# Patient Record
Sex: FEMALE | Race: WHITE | NOT HISPANIC OR LATINO | URBAN - METROPOLITAN AREA
[De-identification: names, ages, dates, MRNs, and addresses within clinical notes are randomized per-mention and may not be internally consistent; named-entity substitution may affect disease eponyms.]

---

## 2020-08-13 ENCOUNTER — INPATIENT (INPATIENT)
Facility: HOSPITAL | Age: 85
LOS: 2 days | Discharge: HOME | End: 2020-08-16
Attending: HOSPITALIST | Admitting: HOSPITALIST
Payer: MEDICARE

## 2020-08-13 VITALS
HEART RATE: 122 BPM | SYSTOLIC BLOOD PRESSURE: 132 MMHG | WEIGHT: 169.98 LBS | OXYGEN SATURATION: 99 % | HEIGHT: 64 IN | DIASTOLIC BLOOD PRESSURE: 77 MMHG | RESPIRATION RATE: 16 BRPM | TEMPERATURE: 99 F

## 2020-08-13 LAB
ALBUMIN SERPL ELPH-MCNC: 3.3 G/DL — SIGNIFICANT CHANGE UP (ref 3.3–5)
ALP SERPL-CCNC: 65 U/L — SIGNIFICANT CHANGE UP (ref 40–120)
ALT FLD-CCNC: 31 U/L — SIGNIFICANT CHANGE UP (ref 12–78)
ANION GAP SERPL CALC-SCNC: 6 MMOL/L — SIGNIFICANT CHANGE UP (ref 5–17)
AST SERPL-CCNC: 35 U/L — SIGNIFICANT CHANGE UP (ref 15–37)
BASOPHILS # BLD AUTO: 0.02 K/UL — SIGNIFICANT CHANGE UP (ref 0–0.2)
BASOPHILS NFR BLD AUTO: 0.5 % — SIGNIFICANT CHANGE UP (ref 0–2)
BILIRUB SERPL-MCNC: 0.4 MG/DL — SIGNIFICANT CHANGE UP (ref 0.2–1.2)
BUN SERPL-MCNC: 23 MG/DL — SIGNIFICANT CHANGE UP (ref 7–23)
CALCIUM SERPL-MCNC: 8.8 MG/DL — SIGNIFICANT CHANGE UP (ref 8.5–10.1)
CHLORIDE SERPL-SCNC: 104 MMOL/L — SIGNIFICANT CHANGE UP (ref 96–108)
CO2 SERPL-SCNC: 23 MMOL/L — SIGNIFICANT CHANGE UP (ref 22–31)
CREAT SERPL-MCNC: 0.58 MG/DL — SIGNIFICANT CHANGE UP (ref 0.5–1.3)
EOSINOPHIL # BLD AUTO: 0.06 K/UL — SIGNIFICANT CHANGE UP (ref 0–0.5)
EOSINOPHIL NFR BLD AUTO: 1.4 % — SIGNIFICANT CHANGE UP (ref 0–6)
GLUCOSE SERPL-MCNC: 129 MG/DL — HIGH (ref 70–99)
HCT VFR BLD CALC: 36.3 % — SIGNIFICANT CHANGE UP (ref 34.5–45)
HGB BLD-MCNC: 11.9 G/DL — SIGNIFICANT CHANGE UP (ref 11.5–15.5)
IMM GRANULOCYTES NFR BLD AUTO: 0.2 % — SIGNIFICANT CHANGE UP (ref 0–1.5)
LYMPHOCYTES # BLD AUTO: 0.71 K/UL — LOW (ref 1–3.3)
LYMPHOCYTES # BLD AUTO: 16.2 % — SIGNIFICANT CHANGE UP (ref 13–44)
MCHC RBC-ENTMCNC: 28.7 PG — SIGNIFICANT CHANGE UP (ref 27–34)
MCHC RBC-ENTMCNC: 32.8 GM/DL — SIGNIFICANT CHANGE UP (ref 32–36)
MCV RBC AUTO: 87.5 FL — SIGNIFICANT CHANGE UP (ref 80–100)
MONOCYTES # BLD AUTO: 0.52 K/UL — SIGNIFICANT CHANGE UP (ref 0–0.9)
MONOCYTES NFR BLD AUTO: 11.9 % — SIGNIFICANT CHANGE UP (ref 2–14)
NEUTROPHILS # BLD AUTO: 3.05 K/UL — SIGNIFICANT CHANGE UP (ref 1.8–7.4)
NEUTROPHILS NFR BLD AUTO: 69.8 % — SIGNIFICANT CHANGE UP (ref 43–77)
NRBC # BLD: 0 /100 WBCS — SIGNIFICANT CHANGE UP (ref 0–0)
PLATELET # BLD AUTO: 160 K/UL — SIGNIFICANT CHANGE UP (ref 150–400)
POTASSIUM SERPL-MCNC: 4 MMOL/L — SIGNIFICANT CHANGE UP (ref 3.5–5.3)
POTASSIUM SERPL-SCNC: 4 MMOL/L — SIGNIFICANT CHANGE UP (ref 3.5–5.3)
PROT SERPL-MCNC: 7.9 GM/DL — SIGNIFICANT CHANGE UP (ref 6–8.3)
RBC # BLD: 4.15 M/UL — SIGNIFICANT CHANGE UP (ref 3.8–5.2)
RBC # FLD: 14.2 % — SIGNIFICANT CHANGE UP (ref 10.3–14.5)
SARS-COV-2 RNA SPEC QL NAA+PROBE: SIGNIFICANT CHANGE UP
SODIUM SERPL-SCNC: 133 MMOL/L — LOW (ref 135–145)
WBC # BLD: 4.37 K/UL — SIGNIFICANT CHANGE UP (ref 3.8–10.5)
WBC # FLD AUTO: 4.37 K/UL — SIGNIFICANT CHANGE UP (ref 3.8–10.5)

## 2020-08-13 PROCEDURE — 72131 CT LUMBAR SPINE W/O DYE: CPT | Mod: 26

## 2020-08-13 PROCEDURE — 99285 EMERGENCY DEPT VISIT HI MDM: CPT

## 2020-08-13 PROCEDURE — 99222 1ST HOSP IP/OBS MODERATE 55: CPT | Mod: AI

## 2020-08-13 RX ORDER — SENNA PLUS 8.6 MG/1
2 TABLET ORAL AT BEDTIME
Refills: 0 | Status: DISCONTINUED | OUTPATIENT
Start: 2020-08-13 | End: 2020-08-16

## 2020-08-13 RX ORDER — KETOROLAC TROMETHAMINE 30 MG/ML
15 SYRINGE (ML) INJECTION ONCE
Refills: 0 | Status: DISCONTINUED | OUTPATIENT
Start: 2020-08-13 | End: 2020-08-13

## 2020-08-13 RX ORDER — GLUCAGON INJECTION, SOLUTION 0.5 MG/.1ML
1 INJECTION, SOLUTION SUBCUTANEOUS ONCE
Refills: 0 | Status: DISCONTINUED | OUTPATIENT
Start: 2020-08-13 | End: 2020-08-16

## 2020-08-13 RX ORDER — HEPARIN SODIUM 5000 [USP'U]/ML
5000 INJECTION INTRAVENOUS; SUBCUTANEOUS EVERY 8 HOURS
Refills: 0 | Status: DISCONTINUED | OUTPATIENT
Start: 2020-08-13 | End: 2020-08-16

## 2020-08-13 RX ORDER — MORPHINE SULFATE 50 MG/1
2 CAPSULE, EXTENDED RELEASE ORAL ONCE
Refills: 0 | Status: DISCONTINUED | OUTPATIENT
Start: 2020-08-13 | End: 2020-08-13

## 2020-08-13 RX ORDER — DEXTROSE 50 % IN WATER 50 %
25 SYRINGE (ML) INTRAVENOUS ONCE
Refills: 0 | Status: DISCONTINUED | OUTPATIENT
Start: 2020-08-13 | End: 2020-08-16

## 2020-08-13 RX ORDER — DEXTROSE 50 % IN WATER 50 %
15 SYRINGE (ML) INTRAVENOUS ONCE
Refills: 0 | Status: DISCONTINUED | OUTPATIENT
Start: 2020-08-13 | End: 2020-08-16

## 2020-08-13 RX ORDER — OXYCODONE HYDROCHLORIDE 5 MG/1
5 TABLET ORAL EVERY 6 HOURS
Refills: 0 | Status: DISCONTINUED | OUTPATIENT
Start: 2020-08-13 | End: 2020-08-16

## 2020-08-13 RX ORDER — SODIUM CHLORIDE 9 MG/ML
1000 INJECTION, SOLUTION INTRAVENOUS
Refills: 0 | Status: DISCONTINUED | OUTPATIENT
Start: 2020-08-13 | End: 2020-08-16

## 2020-08-13 RX ORDER — OXYCODONE AND ACETAMINOPHEN 5; 325 MG/1; MG/1
1 TABLET ORAL ONCE
Refills: 0 | Status: DISCONTINUED | OUTPATIENT
Start: 2020-08-13 | End: 2020-08-13

## 2020-08-13 RX ORDER — DEXTROSE 50 % IN WATER 50 %
12.5 SYRINGE (ML) INTRAVENOUS ONCE
Refills: 0 | Status: DISCONTINUED | OUTPATIENT
Start: 2020-08-13 | End: 2020-08-16

## 2020-08-13 RX ORDER — INSULIN LISPRO 100/ML
VIAL (ML) SUBCUTANEOUS
Refills: 0 | Status: DISCONTINUED | OUTPATIENT
Start: 2020-08-13 | End: 2020-08-16

## 2020-08-13 RX ORDER — INSULIN LISPRO 100/ML
VIAL (ML) SUBCUTANEOUS AT BEDTIME
Refills: 0 | Status: DISCONTINUED | OUTPATIENT
Start: 2020-08-13 | End: 2020-08-16

## 2020-08-13 RX ORDER — MORPHINE SULFATE 50 MG/1
4 CAPSULE, EXTENDED RELEASE ORAL ONCE
Refills: 0 | Status: DISCONTINUED | OUTPATIENT
Start: 2020-08-13 | End: 2020-08-13

## 2020-08-13 RX ORDER — SIMVASTATIN 20 MG/1
40 TABLET, FILM COATED ORAL AT BEDTIME
Refills: 0 | Status: DISCONTINUED | OUTPATIENT
Start: 2020-08-13 | End: 2020-08-16

## 2020-08-13 RX ORDER — ACETAMINOPHEN 500 MG
650 TABLET ORAL EVERY 6 HOURS
Refills: 0 | Status: DISCONTINUED | OUTPATIENT
Start: 2020-08-13 | End: 2020-08-16

## 2020-08-13 RX ADMIN — OXYCODONE AND ACETAMINOPHEN 1 TABLET(S): 5; 325 TABLET ORAL at 23:38

## 2020-08-13 RX ADMIN — Medication 15 MILLIGRAM(S): at 19:47

## 2020-08-13 RX ADMIN — MORPHINE SULFATE 4 MILLIGRAM(S): 50 CAPSULE, EXTENDED RELEASE ORAL at 21:26

## 2020-08-13 RX ADMIN — MORPHINE SULFATE 2 MILLIGRAM(S): 50 CAPSULE, EXTENDED RELEASE ORAL at 19:32

## 2020-08-13 RX ADMIN — OXYCODONE AND ACETAMINOPHEN 1 TABLET(S): 5; 325 TABLET ORAL at 22:50

## 2020-08-13 RX ADMIN — MORPHINE SULFATE 4 MILLIGRAM(S): 50 CAPSULE, EXTENDED RELEASE ORAL at 21:41

## 2020-08-13 RX ADMIN — Medication 15 MILLIGRAM(S): at 19:32

## 2020-08-13 RX ADMIN — MORPHINE SULFATE 2 MILLIGRAM(S): 50 CAPSULE, EXTENDED RELEASE ORAL at 19:47

## 2020-08-13 NOTE — ED ADULT NURSE NOTE - NSIMPLEMENTINTERV_GEN_ALL_ED
Implemented All Fall with Harm Risk Interventions:  [place] to call system. Call bell, personal items and telephone within reach. Instruct patient to call for assistance. Room bathroom lighting operational. Non-slip footwear when patient is off stretcher. Physically safe environment: no spills, clutter or unnecessary equipment. Stretcher in lowest position, wheels locked, appropriate side rails in place. Provide visual cue, wrist band, yellow gown, etc. Monitor gait and stability. Monitor for mental status changes and reorient to person, place, and time. Review medications for side effects contributing to fall risk. Reinforce activity limits and safety measures with patient and family. Provide visual clues: red socks.

## 2020-08-13 NOTE — ED ADULT NURSE NOTE - ED STAT RN HANDOFF DETAILS 2
Report given to receiving RNbrooklyn pts history, current condition and reason for admission discussed, safety concerns addressed and reviewed, pt currently in stable condition, IV flushes for patency and site shows no signs or symptoms of infiltrate, dressing is clean dry and intact, pt is aware of plan of care. Pt education deemed successful at time of report after patient demonstrates successful teach back for proficiency.

## 2020-08-13 NOTE — CONSULT NOTE ADULT - ASSESSMENT
86 y/o  female with PMH of HDL, DM-2 came to the ED complaining of back pain x 2 days. Pain is located in lower back, sharp in nature, non-radiating, 10/10 in severity, took 2 Tylenol before coming to the ED with no relief. In the ED, CT lumbar: multilevel degenerative spondylosis with interval progression since last imaging (2015); Pain medications given, labs done and reviewed.    Lumbar radiculopathy   Admit to medical floor   CT lumbar as stated above   Pain control   PT evaluation in AM     HLD   Simvastatin 40mg (as per outpatient med)     DM-2  Insulin sliding scale     Supportive   DVT prophylaxis: heparin sc   Diet: DASH       Care plan discussed with patient

## 2020-08-13 NOTE — ED PROVIDER NOTE - OBJECTIVE STATEMENT
Triage Nurses Notes: "Back pain x 2 days, denies trauma"    Pertinent PMH/PSH/FHx/SHx and Review of Systems contained within:    84 y/o F with a PMHx of DM and Hypercholesteremia presents to the ED c/o lower back pain (LT side worst than RT side) radiating to her LT legs x3 days. Patient notes that the pain started after a prolong walk.  Patient states that the pain is severe and Tylenol at home is not helping, which prompted the ER visit. Denies falls, injuries, numbness, tingling, changes in bowel or bladder movements, fever, chills or nausea. Patient denies EtOH/tobacco/illicit substance use.    2 call attempts were made to Patient's family listed on the chart. There was no answer and a message was left. Triage Nurses Notes: "Back pain x 2 days, denies trauma"    Pertinent PMH/PSH/FHx/SHx and Review of Systems contained within:    84 y/o F with a PMHx of DM and Hypercholesteremia presents to the ED c/o lower back pain (LT side worst than RT side) radiating to her LT legs x3 days. Patient notes that the pain started after a prolonged walk.  Patient states that the pain is severe and Tylenol at home is not helping, which prompted the ER visit. Denies falls, injuries, numbness, tingling, changes in bowel or bladder movements, fever, chills or nausea. Patient denies EtOH/tobacco/illicit substance use.    spoke with daughter who is present in ER, she confirms the above. Triage Nurses Notes: "Back pain x 2 days, denies trauma"    Pertinent PMH/PSH/FHx/SHx and Review of Systems contained within:    86 y/o F with a PMHx of DM and Hypercholesteremia presents to the ED c/o lower back pain (LT side worst than RT side) radiating to her left leg x3 days. Patient notes that the pain started after a prolonged walk.  Patient states that the pain is severe and Tylenol at home is not helping, which prompted the ER visit. Denies falls, injuries, numbness, tingling, changes in bowel or bladder movements, fever, chills or nausea. Patient denies EtOH/tobacco/illicit substance use.    spoke with daughter who is present in ER, she confirms the above.

## 2020-08-13 NOTE — H&P ADULT - NSHPPHYSICALEXAM_GEN_ALL_CORE
Vital Signs Last 24 Hrs  T(C): 38.1 (14 Aug 2020 00:35), Max: 38.1 (14 Aug 2020 00:35)  T(F): 100.6 (14 Aug 2020 00:35), Max: 100.6 (14 Aug 2020 00:35)  HR: 89 (14 Aug 2020 00:35) (81 - 122)  BP: 142/76 (14 Aug 2020 00:35) (128/67 - 142/76)  BP(mean): 100 (14 Aug 2020 00:35) (100 - 100)  RR: 22 (14 Aug 2020 00:35) (14 - 23)  SpO2: 96% (14 Aug 2020 00:35) (95% - 99%)    PHYSICAL EXAM:    GENERAL: NAD, well-groomed, well-developed  HEAD:  Atraumatic, Normocephalic  EYES: EOMI, PERRLA, conjunctiva and sclera clear  ENMT: No tonsillar erythema, exudates, or enlargement; Moist mucous membranes, No lesions  NECK: Supple, No JVD, Normal thyroid  NERVOUS SYSTEM:  Alert & Oriented X3, Good concentration; Motor Strength 5/5 B/L upper and lower extremities; DTRs 2+ intact and symmetric  CHEST/LUNG: Clear to percussion bilaterally; No rales, rhonchi, wheezing, or rubs  HEART: Regular rate and rhythm; No rubs, or gallops, +S1,S2  ABDOMEN: Soft, Nontender, Nondistended; Bowel sounds present, Back +point tenderness lower back  EXTREMITIES:  2+ Peripheral Pulses, No clubbing, cyanosis, or edema  LYMPH: No cervical adenopathy  RECTAL: deferred  BREAST: deferred  : deferred  SKIN: No rashes or lesions    IMPROVE VTE Individual Risk Assessment        RISK                                                          Points  [  ] Previous VTE                                                3  [  ] Thrombophilia                                             2  [  ] Lower limb paralysis                                    2        (unable to hold up >15 seconds)    [  ] Current Cancer                                             2         (within 6 months)  [  x] Immobilization > 24 hrs                              1  [  ] ICU/CCU stay > 24 hours                            1  [  x] Age > 60                                                    1  IMPROVE VTE Score _____2____

## 2020-08-13 NOTE — CONSULT NOTE ADULT - SUBJECTIVE AND OBJECTIVE BOX
REASON FOR CONSULT: telehospitalist evaluation    HPI:   84 y/o  female with PMH of HDL, DM-2 came to the ED complaining of back pain x 2 days. Pain is located in lower back, sharp in nature, non-radiating, 10/10 in severity, took 2 Tylenol before coming to the ED with no relief. She has no fall, trauma to the affected site, bowel/bladder incontinence, HA, paresthesia, fever, chills, chest pain, shortness of breath.     PMH/surg: DM-2, HLD     Social Hx: no cigarette, alcohol or illicit drug use. Lives with family     Family Hx: non-contributory     Meds: patient could not recall the names of her medications     Allergies:  NKDA       OBJECTIVE    Vital Signs Last 24 Hrs  T(C): 36.7 (13 Aug 2020 22:53), Max: 37.2 (13 Aug 2020 18:38)  T(F): 98.1 (13 Aug 2020 22:53), Max: 99 (13 Aug 2020 18:38)  HR: 83 (13 Aug 2020 23:11) (81 - 122)  BP: 129/62 (13 Aug 2020 23:11) (128/67 - 141/67)  BP(mean): --  RR: 20 (13 Aug 2020 23:11) (14 - 23)  SpO2: 97% (13 Aug 2020 23:11) (95% - 99%)        PHYSICAL EXAM: Tele-evaluation precludes physical exam.       LABS AND IMAGING DATA:                        11.9   4.37  )-----------( 160      ( 13 Aug 2020 19:46 )             36.3     08-13    133<L>  |  104  |  23  ----------------------------<  129<H>  4.0   |  23  |  0.58    Ca    8.8      13 Aug 2020 19:46    TPro  7.9  /  Alb  3.3  /  TBili  0.4  /  DBili  x   /  AST  35  /  ALT  31  /  AlkPhos  65  08-13

## 2020-08-13 NOTE — ED ADULT NURSE NOTE - OBJECTIVE STATEMENT
Pt is received at bed 2 A&Ox3. Pt co of back pain 10/10 that started 2 days ago. As per EMS, pt unable to ambulate due to back pain, usually ambulatory. Pt is tachy  123 and borderline febrile 99 degrees F. Glucose 137. pt has hx of diabetes. Pt is mostly [place] speaking but is able to understand minimal english.

## 2020-08-13 NOTE — H&P ADULT - HISTORY OF PRESENT ILLNESS
Pt is a 84 y/o  female with PMH of HDL, arthritis and DM-2 came to the ED complaining of back pain x 2 days. Pain is located in lower back, sharp in nature, non-radiating, 10/10 in severity, took 2 Tylenol before coming to the ED with no relief. She has no fall, trauma to the affected site, bowel/bladder incontinence, HA, paresthesia, fever, chills, chest pain, shortness of breath, n/v/d/c no travels or sick contacts no acute fidig on ct

## 2020-08-13 NOTE — ED PROVIDER NOTE - NS ED ROS FT
No fever/chills, No photophobia/eye pain/changes in vision, No ear pain/sore throat/dysphagia, No chest pain/palpitations, no SOB/cough/wheeze/stridor, No abdominal pain, No N/V/D, no dysuria/frequency/discharge, No neck, + Lower back pain, no rash, no changes in neurological status/function.

## 2020-08-13 NOTE — ED PROVIDER NOTE - PHYSICAL EXAMINATION
Gen: Alert, Mild distress with movement  Head: NC, AT, PERRL, EOMI, normal lids/conjunctiva  ENT: normal hearing, patent oropharynx without erythema/exudate, uvula midline  Neck: +supple, no tenderness/meningismus/JVD, +Trachea midline  Pulm: Bilateral BS, normal resp effort, no wheeze/stridor/retractions  CV: RRR, no M/R/G, +dist pulses  Abd: soft, NT/ND, Negative [place] signs, +BS, no palpable masses  Mskel: no edema/erythema/cyanosis. TTP of lower lumbar sacral area to the left.   Skin: no rash, warm/dry  Neuro: AAOx3, no apparent sensory/motor deficits, coordination intact Gen: Alert, Mild distress with movement  Head: NC, AT, EOMI, normal lids/conjunctiva  ENT: normal hearing, patent oropharynx without erythema/exudate, uvula midline  Neck: +supple, no tenderness/meningismus/JVD, +Trachea midline  Pulm: Bilateral BS, normal resp effort, no wheeze/stridor/retractions  CV: RRR, no M/R/G, +dist pulses  Abd: soft, NT/ND, Negative [place] signs, +BS, no palpable masses  Mskel: no edema/erythema/cyanosis. TTP of lower lumbar sacral area to the left.   Skin: no rash, warm/dry  Neuro: AAOx3, no apparent sensory/motor deficits, coordination intact, BL motor strength, good tone

## 2020-08-13 NOTE — ED PROVIDER NOTE - CLINICAL SUMMARY MEDICAL DECISION MAKING FREE TEXT BOX
Patient presents with acute onset back pain after prolonged walking.  VSS.  No signs of cord compression. Lab values reviewed, there are no values which require acute intervention.  CT negative for acute fx.  Patient given pain meds in ER, continues to be symptomatic, unable to ambulate secondary to pain.  Will admit for pain control.  Patient is to be admitted to the hospital and the case was discussed with the admitting physician.  Any changes in plan, additional imaging/labs, and further work up will be at the discretion of the admitting physician.

## 2020-08-13 NOTE — H&P ADULT - NSHPLABSRESULTS_GEN_ALL_CORE
LABS:                        11.9   4.37  )-----------( 160      ( 13 Aug 2020 19:46 )             36.3     08-13    133<L>  |  104  |  23  ----------------------------<  129<H>  4.0   |  23  |  0.58    Ca    8.8      13 Aug 2020 19:46    TPro  7.9  /  Alb  3.3  /  TBili  0.4  /  DBili  x   /  AST  35  /  ALT  31  /  AlkPhos  65  08-13        CAPILLARY BLOOD GLUCOSE      POCT Blood Glucose.: 136 mg/dL (14 Aug 2020 00:45)        RADIOLOGY & ADDITIONAL TESTS:    Imaging Personally Reviewed:  [ X] YES  [ ] NO

## 2020-08-13 NOTE — H&P ADULT - PROBLEM SELECTOR PROBLEM 4
Preventive measure Type 2 diabetes mellitus with hyperglycemia, with long-term current use of insulin

## 2020-08-14 DIAGNOSIS — Z29.9 ENCOUNTER FOR PROPHYLACTIC MEASURES, UNSPECIFIED: ICD-10-CM

## 2020-08-14 DIAGNOSIS — M19.90 UNSPECIFIED OSTEOARTHRITIS, UNSPECIFIED SITE: ICD-10-CM

## 2020-08-14 DIAGNOSIS — M54.9 DORSALGIA, UNSPECIFIED: ICD-10-CM

## 2020-08-14 DIAGNOSIS — E11.65 TYPE 2 DIABETES MELLITUS WITH HYPERGLYCEMIA: ICD-10-CM

## 2020-08-14 DIAGNOSIS — E78.00 PURE HYPERCHOLESTEROLEMIA, UNSPECIFIED: ICD-10-CM

## 2020-08-14 LAB
GLUCOSE BLDC GLUCOMTR-MCNC: 136 MG/DL — HIGH (ref 70–99)
GLUCOSE BLDC GLUCOMTR-MCNC: 151 MG/DL — HIGH (ref 70–99)
GLUCOSE BLDC GLUCOMTR-MCNC: 156 MG/DL — HIGH (ref 70–99)
GLUCOSE BLDC GLUCOMTR-MCNC: 170 MG/DL — HIGH (ref 70–99)
GLUCOSE BLDC GLUCOMTR-MCNC: 212 MG/DL — HIGH (ref 70–99)
SARS-COV-2 IGG SERPL QL IA: NEGATIVE — SIGNIFICANT CHANGE UP
SARS-COV-2 IGM SERPL IA-ACNC: <3.8 AU/ML — SIGNIFICANT CHANGE UP

## 2020-08-14 PROCEDURE — 72148 MRI LUMBAR SPINE W/O DYE: CPT | Mod: 26

## 2020-08-14 PROCEDURE — 99233 SBSQ HOSP IP/OBS HIGH 50: CPT

## 2020-08-14 PROCEDURE — 73560 X-RAY EXAM OF KNEE 1 OR 2: CPT | Mod: 26,LT

## 2020-08-14 RX ORDER — DEXAMETHASONE 0.5 MG/5ML
4 ELIXIR ORAL EVERY 8 HOURS
Refills: 0 | Status: COMPLETED | OUTPATIENT
Start: 2020-08-14 | End: 2020-08-16

## 2020-08-14 RX ORDER — KETOROLAC TROMETHAMINE 30 MG/ML
30 SYRINGE (ML) INJECTION ONCE
Refills: 0 | Status: DISCONTINUED | OUTPATIENT
Start: 2020-08-14 | End: 2020-08-14

## 2020-08-14 RX ADMIN — HEPARIN SODIUM 5000 UNIT(S): 5000 INJECTION INTRAVENOUS; SUBCUTANEOUS at 21:15

## 2020-08-14 RX ADMIN — OXYCODONE HYDROCHLORIDE 5 MILLIGRAM(S): 5 TABLET ORAL at 12:26

## 2020-08-14 RX ADMIN — Medication 1: at 11:36

## 2020-08-14 RX ADMIN — HEPARIN SODIUM 5000 UNIT(S): 5000 INJECTION INTRAVENOUS; SUBCUTANEOUS at 14:13

## 2020-08-14 RX ADMIN — Medication 1: at 08:08

## 2020-08-14 RX ADMIN — Medication 650 MILLIGRAM(S): at 00:40

## 2020-08-14 RX ADMIN — Medication 30 MILLIGRAM(S): at 16:53

## 2020-08-14 RX ADMIN — HEPARIN SODIUM 5000 UNIT(S): 5000 INJECTION INTRAVENOUS; SUBCUTANEOUS at 05:24

## 2020-08-14 RX ADMIN — Medication 1: at 16:52

## 2020-08-14 RX ADMIN — Medication 4 MILLIGRAM(S): at 21:15

## 2020-08-14 RX ADMIN — Medication 4 MILLIGRAM(S): at 14:14

## 2020-08-14 RX ADMIN — SENNA PLUS 2 TABLET(S): 8.6 TABLET ORAL at 21:15

## 2020-08-14 RX ADMIN — SIMVASTATIN 40 MILLIGRAM(S): 20 TABLET, FILM COATED ORAL at 21:15

## 2020-08-14 NOTE — PHYSICAL THERAPY INITIAL EVALUATION ADULT - PERTINENT HX OF CURRENT PROBLEM, REHAB EVAL
Pt is a 84 y/o  female with PMH of HDL, arthritis and DM-2 came to the ED complaining of back pain x 2 days. Pain is located in lower back, sharp in nature, non-radiating, 10/10 in severity, took 2 Tylenol before coming to the ED with no relief. She has no fall, trauma to the affected site, bowel/bladder incontinence, HA, paresthesia, fever, chills, chest pain, shortness of breath, n/v/d/c no travels or sick contacts. L knee XR (-), CT lumbar spine with no acute findings (+) degenerative changes

## 2020-08-14 NOTE — PHYSICAL THERAPY INITIAL EVALUATION ADULT - ADDITIONAL COMMENTS
Daughter was able to provide information regarding previous level of function and home environment:   Pt lives in a private house with 3 steps to enter with B handrails that are wide apart, once inside there are no further steps to negotiate. Bathroom is a tub shower with regular toilet, no grab bars in bathroom. there is a rolling walker at home but it was pt's spouse, no other dme in the home. Pt is R hand dominant, wears glasses, does not use dme, (-) falls, (-) buckling, (+) community distance ambulator, does not drive.

## 2020-08-14 NOTE — PHYSICAL THERAPY INITIAL EVALUATION ADULT - ACTIVE RANGE OF MOTION EXAMINATION, REHAB EVAL
deficits as listed below/L hip PROM: flexion to 30 degrees, abduction to 5 degrees, ER to neutral, IR to neutral/Right LE Active ROM was WFL (within functional limits)/bilateral upper extremity Active ROM was WFL (within functional limits)

## 2020-08-15 LAB
GLUCOSE BLDC GLUCOMTR-MCNC: 161 MG/DL — HIGH (ref 70–99)
GLUCOSE BLDC GLUCOMTR-MCNC: 174 MG/DL — HIGH (ref 70–99)
GLUCOSE BLDC GLUCOMTR-MCNC: 249 MG/DL — HIGH (ref 70–99)
GLUCOSE BLDC GLUCOMTR-MCNC: 254 MG/DL — HIGH (ref 70–99)

## 2020-08-15 PROCEDURE — 99233 SBSQ HOSP IP/OBS HIGH 50: CPT

## 2020-08-15 RX ADMIN — Medication 650 MILLIGRAM(S): at 21:06

## 2020-08-15 RX ADMIN — HEPARIN SODIUM 5000 UNIT(S): 5000 INJECTION INTRAVENOUS; SUBCUTANEOUS at 05:15

## 2020-08-15 RX ADMIN — Medication 1: at 08:25

## 2020-08-15 RX ADMIN — HEPARIN SODIUM 5000 UNIT(S): 5000 INJECTION INTRAVENOUS; SUBCUTANEOUS at 13:43

## 2020-08-15 RX ADMIN — Medication 1: at 16:04

## 2020-08-15 RX ADMIN — Medication 4 MILLIGRAM(S): at 21:10

## 2020-08-15 RX ADMIN — SIMVASTATIN 40 MILLIGRAM(S): 20 TABLET, FILM COATED ORAL at 21:09

## 2020-08-15 RX ADMIN — Medication 2: at 11:50

## 2020-08-15 RX ADMIN — Medication 4 MILLIGRAM(S): at 05:15

## 2020-08-15 RX ADMIN — SENNA PLUS 2 TABLET(S): 8.6 TABLET ORAL at 21:10

## 2020-08-15 RX ADMIN — OXYCODONE HYDROCHLORIDE 5 MILLIGRAM(S): 5 TABLET ORAL at 06:17

## 2020-08-15 RX ADMIN — OXYCODONE HYDROCHLORIDE 5 MILLIGRAM(S): 5 TABLET ORAL at 05:17

## 2020-08-15 RX ADMIN — Medication 650 MILLIGRAM(S): at 22:06

## 2020-08-15 RX ADMIN — HEPARIN SODIUM 5000 UNIT(S): 5000 INJECTION INTRAVENOUS; SUBCUTANEOUS at 21:10

## 2020-08-15 RX ADMIN — Medication 1: at 21:49

## 2020-08-15 RX ADMIN — Medication 4 MILLIGRAM(S): at 13:43

## 2020-08-15 NOTE — PROGRESS NOTE ADULT - PROBLEM SELECTOR PROBLEM 4
Type 2 diabetes mellitus with hyperglycemia, with long-term current use of insulin
Type 2 diabetes mellitus with hyperglycemia, with long-term current use of insulin

## 2020-08-15 NOTE — PROGRESS NOTE ADULT - SUBJECTIVE AND OBJECTIVE BOX
Patient is a 85y old  Female who presents with a chief complaint of LBP (13 Aug 2020 23:44)      INTERVAL HPI/OVERNIGHT EVENTS: c/o of knee and LBP. denies radiculopathy numbness muscle weakness or incontinence     MEDICATIONS  (STANDING):  dexAMETHasone  Injectable 5 milliGRAM(s) IV Push every 8 hours  dextrose 5%. 1000 milliLiter(s) (50 mL/Hr) IV Continuous <Continuous>  dextrose 50% Injectable 12.5 Gram(s) IV Push once  dextrose 50% Injectable 25 Gram(s) IV Push once  dextrose 50% Injectable 25 Gram(s) IV Push once  heparin   Injectable 5000 Unit(s) SubCutaneous every 8 hours  insulin lispro (HumaLOG) corrective regimen sliding scale   SubCutaneous three times a day before meals  insulin lispro (HumaLOG) corrective regimen sliding scale   SubCutaneous at bedtime  senna 2 Tablet(s) Oral at bedtime  simvastatin 40 milliGRAM(s) Oral at bedtime    MEDICATIONS  (PRN):  acetaminophen   Tablet .. 650 milliGRAM(s) Oral every 6 hours PRN Temp greater or equal to 38C (100.4F), Mild Pain (1 - 3)  dextrose 40% Gel 15 Gram(s) Oral once PRN Blood Glucose LESS THAN 70 milliGRAM(s)/deciliter  glucagon  Injectable 1 milliGRAM(s) IntraMuscular once PRN Glucose LESS THAN 70 milligrams/deciliter  oxyCODONE    IR 5 milliGRAM(s) Oral every 6 hours PRN Severe Pain (7 - 10)      Allergies    No Known Allergies    Intolerances        REVIEW OF SYSTEMS:  CONSTITUTIONAL: No fever, weight loss, or fatigue  EYES: No eye pain, visual disturbances, or discharge  ENMT:  No difficulty hearing, tinnitus, vertigo; No sinus or throat pain  NECK: No pain or stiffness  RESPIRATORY: No cough, wheezing, chills or hemoptysis; No shortness of breath  CARDIOVASCULAR: No chest pain, palpitations, dizziness, or leg swelling  GASTROINTESTINAL: No abdominal or epigastric pain. No nausea, vomiting, or hematemesis; No diarrhea or constipation. No melena or hematochezia.  GENITOURINARY: No dysuria, frequency, hematuria, or incontinence  NEUROLOGICAL: No headaches, memory loss, loss of strength, numbness, or tremors  SKIN: No itching, burning, rashes, or lesions   LYMPH NODES: No enlarged glands    Vital Signs Last 24 Hrs  T(C): 36.3 (14 Aug 2020 11:02), Max: 38.1 (14 Aug 2020 00:35)  T(F): 97.3 (14 Aug 2020 11:02), Max: 100.6 (14 Aug 2020 00:35)  HR: 78 (14 Aug 2020 11:02) (76 - 122)  BP: 115/61 (14 Aug 2020 11:02) (104/58 - 154/74)  BP(mean): 100 (14 Aug 2020 00:35) (100 - 100)  RR: 16 (14 Aug 2020 11:02) (14 - 23)  SpO2: 95% (14 Aug 2020 11:02) (94% - 99%)    PHYSICAL EXAM:  GENERAL: NAD, well-groomed, well-developed  HEAD:  Atraumatic, Normocephalic  EYES: EOMI, PERRLA, conjunctiva and sclera clear  ENMT: No tonsillar erythema, exudates, or enlargement; Moist mucous membranes, Good dentition, No lesions  NECK: Supple, No JVD, Normal thyroid  NERVOUS SYSTEM:  Alert & Oriented X3, Good concentration; Motor Strength 5/5 B/L upper and lower extremities; DTRs 2+ intact and symmetric  CHEST/LUNG: Clear to percussion bilaterally; No rales, rhonchi, wheezing, or rubs  HEART: Regular rate and rhythm; No murmurs, rubs, or gallops  ABDOMEN: Soft, Nontender, Nondistended; Bowel sounds present  EXTREMITIES:  2+ Peripheral Pulses, No clubbing, cyanosis, or edema  LYMPH: No lymphadenopathy noted  SKIN: No rashes or lesions    LABS:                        11.9   4.37  )-----------( 160      ( 13 Aug 2020 19:46 )             36.3     08-13    133<L>  |  104  |  23  ----------------------------<  129<H>  4.0   |  23  |  0.58    Ca    8.8      13 Aug 2020 19:46    TPro  7.9  /  Alb  3.3  /  TBili  0.4  /  DBili  x   /  AST  35  /  ALT  31  /  AlkPhos  65  08-13        CAPILLARY BLOOD GLUCOSE      POCT Blood Glucose.: 156 mg/dL (14 Aug 2020 10:55)  POCT Blood Glucose.: 151 mg/dL (14 Aug 2020 07:48)  POCT Blood Glucose.: 136 mg/dL (14 Aug 2020 00:45)      RADIOLOGY & ADDITIONAL TESTS:    Imaging Personally Reviewed:  [ ] YES  [ ] NO    Consultant(s) Notes Reviewed:  [ ] YES  [ ] NO    Care Discussed with Consultants/Other Providers [ ] YES  [ ] NO
Patient is a 85y old  Female who presents with a chief complaint of LBP (13 Aug 2020 23:44)      INTERVAL HPI/OVERNIGHT EVENTS: back pain much improved. denies radiculopathy numbness muscle weakness or incontinence     MEDICATIONS  (STANDING):  dexAMETHasone  Injectable 5 milliGRAM(s) IV Push every 8 hours  dextrose 5%. 1000 milliLiter(s) (50 mL/Hr) IV Continuous <Continuous>  dextrose 50% Injectable 12.5 Gram(s) IV Push once  dextrose 50% Injectable 25 Gram(s) IV Push once  dextrose 50% Injectable 25 Gram(s) IV Push once  heparin   Injectable 5000 Unit(s) SubCutaneous every 8 hours  insulin lispro (HumaLOG) corrective regimen sliding scale   SubCutaneous three times a day before meals  insulin lispro (HumaLOG) corrective regimen sliding scale   SubCutaneous at bedtime  senna 2 Tablet(s) Oral at bedtime  simvastatin 40 milliGRAM(s) Oral at bedtime    MEDICATIONS  (PRN):  acetaminophen   Tablet .. 650 milliGRAM(s) Oral every 6 hours PRN Temp greater or equal to 38C (100.4F), Mild Pain (1 - 3)  dextrose 40% Gel 15 Gram(s) Oral once PRN Blood Glucose LESS THAN 70 milliGRAM(s)/deciliter  glucagon  Injectable 1 milliGRAM(s) IntraMuscular once PRN Glucose LESS THAN 70 milligrams/deciliter  oxyCODONE    IR 5 milliGRAM(s) Oral every 6 hours PRN Severe Pain (7 - 10)      Allergies    No Known Allergies    Intolerances        REVIEW OF SYSTEMS:  CONSTITUTIONAL: No fever, weight loss, or fatigue  EYES: No eye pain, visual disturbances, or discharge  ENMT:  No difficulty hearing, tinnitus, vertigo; No sinus or throat pain  NECK: No pain or stiffness  RESPIRATORY: No cough, wheezing, chills or hemoptysis; No shortness of breath  CARDIOVASCULAR: No chest pain, palpitations, dizziness, or leg swelling  GASTROINTESTINAL: No abdominal or epigastric pain. No nausea, vomiting, or hematemesis; No diarrhea or constipation. No melena or hematochezia.  GENITOURINARY: No dysuria, frequency, hematuria, or incontinence  NEUROLOGICAL: No headaches, memory loss, loss of strength, numbness, or tremors  SKIN: No itching, burning, rashes, or lesions   LYMPH NODES: No enlarged glands    Vital Signs Last 24 Hrs  T(C): 36.6 (15 Aug 2020 06:22), Max: 37.3 (14 Aug 2020 16:54)  T(F): 97.9 (15 Aug 2020 06:22), Max: 99.1 (14 Aug 2020 16:54)  HR: 71 (15 Aug 2020 06:22) (58 - 90)  BP: 123/60 (15 Aug 2020 06:22) (123/60 - 143/64)  BP(mean): --  RR: 18 (14 Aug 2020 23:45) (18 - 18)  SpO2: 96% (14 Aug 2020 23:45) (94% - 96%)    PHYSICAL EXAM:  GENERAL: NAD, well-groomed, well-developed  HEAD:  Atraumatic, Normocephalic  EYES: EOMI, PERRLA, conjunctiva and sclera clear  ENMT: No tonsillar erythema, exudates, or enlargement; Moist mucous membranes, Good dentition, No lesions  NECK: Supple, No JVD, Normal thyroid  NERVOUS SYSTEM:  Alert & Oriented X3, Good concentration; Motor Strength 5/5 B/L upper and lower extremities; DTRs 2+ intact and symmetric  CHEST/LUNG: Clear to percussion bilaterally; No rales, rhonchi, wheezing, or rubs  HEART: Regular rate and rhythm; No murmurs, rubs, or gallops  ABDOMEN: Soft, Nontender, Nondistended; Bowel sounds present  BACK: normal leg raise   EXTREMITIES:  2+ Peripheral Pulses, No clubbing, cyanosis, or edema  LYMPH: No lymphadenopathy noted  SKIN: No rashes or lesions    LABS:                        11.9   4.37  )-----------( 160      ( 13 Aug 2020 19:46 )             36.3     08-13    133<L>  |  104  |  23  ----------------------------<  129<H>  4.0   |  23  |  0.58    Ca    8.8      13 Aug 2020 19:46    TPro  7.9  /  Alb  3.3  /  TBili  0.4  /  DBili  x   /  AST  35  /  ALT  31  /  AlkPhos  65  08-13        CAPILLARY BLOOD GLUCOSE        RADIOLOGY & ADDITIONAL TESTS:    Imaging Personally Reviewed:  [x ] YES  [ ] NO    Consultant(s) Notes Reviewed:  [x ] YES  [ ] NO    Care Discussed with Consultants/Other Providers [ ] YES  [ ] NO

## 2020-08-15 NOTE — PROGRESS NOTE ADULT - PROBLEM SELECTOR PLAN 1
ct w/o acute findings radicular sxs. has long hx of back pain, will give a trial of steroids and NSAIDs   pain management   pt consult  check knee xrays
pain much imrpoved today mri and ct w/o acute findingshas long hx of back pain, c/w  steroids and NSAIDs   pain management   pt consult pending but likely to be dc back home as shes getting up around the room    knee xrays shows OA and pain imrpoved

## 2020-08-16 ENCOUNTER — TRANSCRIPTION ENCOUNTER (OUTPATIENT)
Age: 85
End: 2020-08-16

## 2020-08-16 VITALS
DIASTOLIC BLOOD PRESSURE: 78 MMHG | RESPIRATION RATE: 18 BRPM | HEART RATE: 78 BPM | OXYGEN SATURATION: 94 % | TEMPERATURE: 97 F | SYSTOLIC BLOOD PRESSURE: 150 MMHG

## 2020-08-16 LAB
GLUCOSE BLDC GLUCOMTR-MCNC: 162 MG/DL — HIGH (ref 70–99)
GLUCOSE BLDC GLUCOMTR-MCNC: 200 MG/DL — HIGH (ref 70–99)

## 2020-08-16 PROCEDURE — 99239 HOSP IP/OBS DSCHRG MGMT >30: CPT

## 2020-08-16 RX ORDER — DEXAMETHASONE 0.5 MG/5ML
1 ELIXIR ORAL
Qty: 4 | Refills: 0
Start: 2020-08-16 | End: 2020-08-17

## 2020-08-16 RX ORDER — SENNA PLUS 8.6 MG/1
2 TABLET ORAL
Qty: 0 | Refills: 0 | DISCHARGE
Start: 2020-08-16

## 2020-08-16 RX ORDER — OXYCODONE HYDROCHLORIDE 5 MG/1
1 TABLET ORAL
Qty: 6 | Refills: 0
Start: 2020-08-16 | End: 2020-08-17

## 2020-08-16 RX ADMIN — Medication 1: at 07:50

## 2020-08-16 RX ADMIN — Medication 4 MILLIGRAM(S): at 05:20

## 2020-08-16 RX ADMIN — Medication 1: at 11:37

## 2020-08-16 RX ADMIN — HEPARIN SODIUM 5000 UNIT(S): 5000 INJECTION INTRAVENOUS; SUBCUTANEOUS at 05:21

## 2020-08-16 NOTE — DISCHARGE NOTE NURSING/CASE MANAGEMENT/SOCIAL WORK - NSDCPNINST_GEN_ALL_CORE
Pts daughter at bedside.  Understanding of discharge instructions, medications and scheduling follow up appointments verbalized by pt and daughter.  Written instructions given.

## 2020-08-16 NOTE — DISCHARGE NOTE PROVIDER - HOSPITAL COURSE
pt w/l chronic Lbp presnents with acute pain. denies trauma          Problem/Plan - 1:    ·  Problem: Intractable back pain.  Plan: pain much improved today after steroids and analgesia. mri and ct w/o acute findings. has long hx of back pain,      pain management     pt consult recommending home physical therapy.     stable for discharge with follow up with orthopedist          Problem/Plan - 2:    ·  Problem: Hypercholesteremia.  Plan: statin.          Problem/Plan - 3:    ·  Problem: Arthritis.  Plan: as above.          Problem/Plan - 4:    ·  Problem: Type 2 diabetes mellitus with hyperglycemia, with long-term current use of insulin.  Plan:.

## 2020-08-16 NOTE — DISCHARGE NOTE NURSING/CASE MANAGEMENT/SOCIAL WORK - PATIENT PORTAL LINK FT
You can access the FollowMyHealth Patient Portal offered by [facility] by registering at the following website: [email or web address]. By joining [employer]’s FollowMyHealth portal, you will also be able to view your health information using other applications (apps) compatible with our system.

## 2020-08-16 NOTE — DISCHARGE NOTE PROVIDER - NSDCCPCAREPLAN_GEN_ALL_CORE_FT
PRINCIPAL DISCHARGE DIAGNOSIS  Diagnosis: Intractable back pain  Assessment and Plan of Treatment: pt w/l chronic Lbp presnents with acute pain. denies trauma    Problem/Plan - 1:  ·  Problem: Intractable back pain.  Plan: pain much improved today after steroids and analgesia. mri and ct w/o acute findings. has long hx of back pain,    pain management   pt consult recommending home physical therapy.   stable for discharge with follow up with orthopedist    Problem/Plan - 2:  ·  Problem: Hypercholesteremia.  Plan: statin.    Problem/Plan - 3:  ·  Problem: Arthritis.  Plan: as above.    Problem/Plan - 4:  ·  Problem: Type 2 diabetes mellitus with hyperglycemia, with long-term current use of insulin.  Plan:.

## 2020-08-16 NOTE — DISCHARGE NOTE PROVIDER - CARE PROVIDER_API CALL
Ary Edwards S  ORTHOPAEDIC SURGERY  [address] [facility] [address]  [place] [address]  Phone: [phone]  Fax: [phone]  Follow Up Time:

## 2020-08-16 NOTE — DISCHARGE NOTE PROVIDER - NSDCMRMEDTOKEN_GEN_ALL_CORE_FT
celecoxib 200 mg oral capsule: 1 cap(s) orally once a day  methocarbamol 500 mg oral tablet: 1 tab(s) orally 3 times a day  senna oral tablet: 2 tab(s) orally once a day (at bedtime)  simvastatin 40 mg oral tablet: 1 tab(s) orally once a day (at bedtime)  traMADol 50 mg oral tablet: 1 tab(s) orally 4 times a day, As Needed -Mild Pain

## 2020-08-19 ENCOUNTER — INPATIENT (INPATIENT)
Facility: HOSPITAL | Age: 85
LOS: 1 days | Discharge: NURSING, REHAB OR LONG-TERM CARE | End: 2020-08-21
Attending: INTERNAL MEDICINE | Admitting: INTERNAL MEDICINE
Payer: MEDICARE

## 2020-08-19 VITALS
DIASTOLIC BLOOD PRESSURE: 74 MMHG | HEIGHT: 60 IN | WEIGHT: 169.98 LBS | OXYGEN SATURATION: 99 % | TEMPERATURE: 98 F | SYSTOLIC BLOOD PRESSURE: 141 MMHG | HEART RATE: 71 BPM | RESPIRATION RATE: 16 BRPM

## 2020-08-19 LAB
ALBUMIN SERPL ELPH-MCNC: 3.5 G/DL — SIGNIFICANT CHANGE UP (ref 3.3–5)
ALP SERPL-CCNC: 68 U/L — SIGNIFICANT CHANGE UP (ref 40–120)
ALT FLD-CCNC: 67 U/L — SIGNIFICANT CHANGE UP (ref 12–78)
ANION GAP SERPL CALC-SCNC: 10 MMOL/L — SIGNIFICANT CHANGE UP (ref 5–17)
APPEARANCE UR: CLEAR — SIGNIFICANT CHANGE UP
APTT BLD: 23.9 SEC — LOW (ref 27.5–35.5)
AST SERPL-CCNC: 36 U/L — SIGNIFICANT CHANGE UP (ref 15–37)
BASOPHILS # BLD AUTO: 0.02 K/UL — SIGNIFICANT CHANGE UP (ref 0–0.2)
BASOPHILS NFR BLD AUTO: 0.2 % — SIGNIFICANT CHANGE UP (ref 0–2)
BILIRUB SERPL-MCNC: 0.5 MG/DL — SIGNIFICANT CHANGE UP (ref 0.2–1.2)
BILIRUB UR-MCNC: NEGATIVE — SIGNIFICANT CHANGE UP
BUN SERPL-MCNC: 34 MG/DL — HIGH (ref 7–23)
CALCIUM SERPL-MCNC: 9.5 MG/DL — SIGNIFICANT CHANGE UP (ref 8.5–10.1)
CHLORIDE SERPL-SCNC: 103 MMOL/L — SIGNIFICANT CHANGE UP (ref 96–108)
CO2 SERPL-SCNC: 27 MMOL/L — SIGNIFICANT CHANGE UP (ref 22–31)
COLOR SPEC: YELLOW — SIGNIFICANT CHANGE UP
CREAT SERPL-MCNC: 0.92 MG/DL — SIGNIFICANT CHANGE UP (ref 0.5–1.3)
DIFF PNL FLD: NEGATIVE — SIGNIFICANT CHANGE UP
EOSINOPHIL # BLD AUTO: 0.04 K/UL — SIGNIFICANT CHANGE UP (ref 0–0.5)
EOSINOPHIL NFR BLD AUTO: 0.4 % — SIGNIFICANT CHANGE UP (ref 0–6)
GLUCOSE BLDC GLUCOMTR-MCNC: 106 MG/DL — HIGH (ref 70–99)
GLUCOSE BLDC GLUCOMTR-MCNC: 114 MG/DL — HIGH (ref 70–99)
GLUCOSE SERPL-MCNC: 98 MG/DL — SIGNIFICANT CHANGE UP (ref 70–99)
GLUCOSE UR QL: NEGATIVE MG/DL — SIGNIFICANT CHANGE UP
HCT VFR BLD CALC: 38.1 % — SIGNIFICANT CHANGE UP (ref 34.5–45)
HGB BLD-MCNC: 12.5 G/DL — SIGNIFICANT CHANGE UP (ref 11.5–15.5)
IMM GRANULOCYTES NFR BLD AUTO: 1.3 % — SIGNIFICANT CHANGE UP (ref 0–1.5)
INR BLD: 1.06 RATIO — SIGNIFICANT CHANGE UP (ref 0.88–1.16)
KETONES UR-MCNC: NEGATIVE — SIGNIFICANT CHANGE UP
LEUKOCYTE ESTERASE UR-ACNC: NEGATIVE — SIGNIFICANT CHANGE UP
LYMPHOCYTES # BLD AUTO: 28.4 % — SIGNIFICANT CHANGE UP (ref 13–44)
LYMPHOCYTES # BLD AUTO: 3.11 K/UL — SIGNIFICANT CHANGE UP (ref 1–3.3)
MCHC RBC-ENTMCNC: 28 PG — SIGNIFICANT CHANGE UP (ref 27–34)
MCHC RBC-ENTMCNC: 32.8 GM/DL — SIGNIFICANT CHANGE UP (ref 32–36)
MCV RBC AUTO: 85.4 FL — SIGNIFICANT CHANGE UP (ref 80–100)
MONOCYTES # BLD AUTO: 1.39 K/UL — HIGH (ref 0–0.9)
MONOCYTES NFR BLD AUTO: 12.7 % — SIGNIFICANT CHANGE UP (ref 2–14)
NEUTROPHILS # BLD AUTO: 6.26 K/UL — SIGNIFICANT CHANGE UP (ref 1.8–7.4)
NEUTROPHILS NFR BLD AUTO: 57 % — SIGNIFICANT CHANGE UP (ref 43–77)
NITRITE UR-MCNC: NEGATIVE — SIGNIFICANT CHANGE UP
NRBC # BLD: 0 /100 WBCS — SIGNIFICANT CHANGE UP (ref 0–0)
PH UR: 8 — SIGNIFICANT CHANGE UP (ref 5–8)
PLATELET # BLD AUTO: 306 K/UL — SIGNIFICANT CHANGE UP (ref 150–400)
POTASSIUM SERPL-MCNC: 4.2 MMOL/L — SIGNIFICANT CHANGE UP (ref 3.5–5.3)
POTASSIUM SERPL-SCNC: 4.2 MMOL/L — SIGNIFICANT CHANGE UP (ref 3.5–5.3)
PROT SERPL-MCNC: 8 GM/DL — SIGNIFICANT CHANGE UP (ref 6–8.3)
PROT UR-MCNC: NEGATIVE MG/DL — SIGNIFICANT CHANGE UP
PROTHROM AB SERPL-ACNC: 12.3 SEC — SIGNIFICANT CHANGE UP (ref 10.6–13.6)
RBC # BLD: 4.46 M/UL — SIGNIFICANT CHANGE UP (ref 3.8–5.2)
RBC # FLD: 14.4 % — SIGNIFICANT CHANGE UP (ref 10.3–14.5)
SARS-COV-2 RNA SPEC QL NAA+PROBE: SIGNIFICANT CHANGE UP
SODIUM SERPL-SCNC: 140 MMOL/L — SIGNIFICANT CHANGE UP (ref 135–145)
SP GR SPEC: 1.01 — SIGNIFICANT CHANGE UP (ref 1.01–1.02)
UROBILINOGEN FLD QL: NEGATIVE MG/DL — SIGNIFICANT CHANGE UP
WBC # BLD: 10.96 K/UL — HIGH (ref 3.8–10.5)
WBC # FLD AUTO: 10.96 K/UL — HIGH (ref 3.8–10.5)

## 2020-08-19 PROCEDURE — 99223 1ST HOSP IP/OBS HIGH 75: CPT

## 2020-08-19 PROCEDURE — 99233 SBSQ HOSP IP/OBS HIGH 50: CPT

## 2020-08-19 PROCEDURE — 99285 EMERGENCY DEPT VISIT HI MDM: CPT

## 2020-08-19 RX ORDER — CELECOXIB 200 MG/1
200 CAPSULE ORAL DAILY
Refills: 0 | Status: DISCONTINUED | OUTPATIENT
Start: 2020-08-19 | End: 2020-08-21

## 2020-08-19 RX ORDER — MORPHINE SULFATE 50 MG/1
4 CAPSULE, EXTENDED RELEASE ORAL ONCE
Refills: 0 | Status: DISCONTINUED | OUTPATIENT
Start: 2020-08-19 | End: 2020-08-19

## 2020-08-19 RX ORDER — DEXTROSE 50 % IN WATER 50 %
25 SYRINGE (ML) INTRAVENOUS ONCE
Refills: 0 | Status: DISCONTINUED | OUTPATIENT
Start: 2020-08-19 | End: 2020-08-21

## 2020-08-19 RX ORDER — HEPARIN SODIUM 5000 [USP'U]/ML
5000 INJECTION INTRAVENOUS; SUBCUTANEOUS EVERY 12 HOURS
Refills: 0 | Status: DISCONTINUED | OUTPATIENT
Start: 2020-08-19 | End: 2020-08-21

## 2020-08-19 RX ORDER — GLUCAGON INJECTION, SOLUTION 0.5 MG/.1ML
1 INJECTION, SOLUTION SUBCUTANEOUS ONCE
Refills: 0 | Status: DISCONTINUED | OUTPATIENT
Start: 2020-08-19 | End: 2020-08-21

## 2020-08-19 RX ORDER — INSULIN LISPRO 100/ML
VIAL (ML) SUBCUTANEOUS
Refills: 0 | Status: DISCONTINUED | OUTPATIENT
Start: 2020-08-19 | End: 2020-08-21

## 2020-08-19 RX ORDER — METHOCARBAMOL 500 MG/1
500 TABLET, FILM COATED ORAL ONCE
Refills: 0 | Status: COMPLETED | OUTPATIENT
Start: 2020-08-19 | End: 2020-08-19

## 2020-08-19 RX ORDER — CELECOXIB 200 MG/1
200 CAPSULE ORAL ONCE
Refills: 0 | Status: COMPLETED | OUTPATIENT
Start: 2020-08-19 | End: 2020-08-19

## 2020-08-19 RX ORDER — TRAMADOL HYDROCHLORIDE 50 MG/1
50 TABLET ORAL EVERY 6 HOURS
Refills: 0 | Status: DISCONTINUED | OUTPATIENT
Start: 2020-08-19 | End: 2020-08-21

## 2020-08-19 RX ORDER — DEXTROSE 50 % IN WATER 50 %
15 SYRINGE (ML) INTRAVENOUS ONCE
Refills: 0 | Status: DISCONTINUED | OUTPATIENT
Start: 2020-08-19 | End: 2020-08-21

## 2020-08-19 RX ORDER — SIMVASTATIN 20 MG/1
40 TABLET, FILM COATED ORAL AT BEDTIME
Refills: 0 | Status: DISCONTINUED | OUTPATIENT
Start: 2020-08-19 | End: 2020-08-21

## 2020-08-19 RX ORDER — INSULIN LISPRO 100/ML
VIAL (ML) SUBCUTANEOUS AT BEDTIME
Refills: 0 | Status: DISCONTINUED | OUTPATIENT
Start: 2020-08-19 | End: 2020-08-21

## 2020-08-19 RX ORDER — SODIUM CHLORIDE 9 MG/ML
1000 INJECTION, SOLUTION INTRAVENOUS
Refills: 0 | Status: DISCONTINUED | OUTPATIENT
Start: 2020-08-19 | End: 2020-08-21

## 2020-08-19 RX ORDER — METHOCARBAMOL 500 MG/1
500 TABLET, FILM COATED ORAL
Refills: 0 | Status: DISCONTINUED | OUTPATIENT
Start: 2020-08-19 | End: 2020-08-21

## 2020-08-19 RX ORDER — SENNA PLUS 8.6 MG/1
2 TABLET ORAL AT BEDTIME
Refills: 0 | Status: DISCONTINUED | OUTPATIENT
Start: 2020-08-19 | End: 2020-08-21

## 2020-08-19 RX ORDER — DEXTROSE 50 % IN WATER 50 %
12.5 SYRINGE (ML) INTRAVENOUS ONCE
Refills: 0 | Status: DISCONTINUED | OUTPATIENT
Start: 2020-08-19 | End: 2020-08-21

## 2020-08-19 RX ADMIN — SENNA PLUS 2 TABLET(S): 8.6 TABLET ORAL at 18:01

## 2020-08-19 RX ADMIN — METHOCARBAMOL 500 MILLIGRAM(S): 500 TABLET, FILM COATED ORAL at 18:01

## 2020-08-19 RX ADMIN — CELECOXIB 200 MILLIGRAM(S): 200 CAPSULE ORAL at 16:09

## 2020-08-19 RX ADMIN — MORPHINE SULFATE 4 MILLIGRAM(S): 50 CAPSULE, EXTENDED RELEASE ORAL at 21:24

## 2020-08-19 RX ADMIN — SIMVASTATIN 40 MILLIGRAM(S): 20 TABLET, FILM COATED ORAL at 18:01

## 2020-08-19 RX ADMIN — METHOCARBAMOL 500 MILLIGRAM(S): 500 TABLET, FILM COATED ORAL at 13:45

## 2020-08-19 RX ADMIN — CELECOXIB 200 MILLIGRAM(S): 200 CAPSULE ORAL at 19:16

## 2020-08-19 RX ADMIN — TRAMADOL HYDROCHLORIDE 50 MILLIGRAM(S): 50 TABLET ORAL at 19:16

## 2020-08-19 RX ADMIN — CELECOXIB 200 MILLIGRAM(S): 200 CAPSULE ORAL at 18:01

## 2020-08-19 RX ADMIN — MORPHINE SULFATE 4 MILLIGRAM(S): 50 CAPSULE, EXTENDED RELEASE ORAL at 21:05

## 2020-08-19 RX ADMIN — CELECOXIB 200 MILLIGRAM(S): 200 CAPSULE ORAL at 13:45

## 2020-08-19 RX ADMIN — TRAMADOL HYDROCHLORIDE 50 MILLIGRAM(S): 50 TABLET ORAL at 18:01

## 2020-08-19 NOTE — PHYSICAL THERAPY INITIAL EVALUATION ADULT - MANUAL MUSCLE TESTING RESULTS, REHAB EVAL
UE grossly 4+/5 to 5/5; both LE unable to assess accurately due to c/o severe pain in low back up movt of the lower extremities

## 2020-08-19 NOTE — CHART NOTE - NSCHARTNOTEFT_GEN_A_CORE
Received message that daughter Katie called regarding pt's discharge medications.  Called daughter back, she states that she did not receive celexa or robaxin medications as instructed on discharge and patient is in pain.  At the time of my call the Paramedics were already at patient's house.  As per paramedics, she called for back pain and the daughter still wants patient to come back to hospital.  Since patient is coming back, writer will not rx the missing prescriptions at this time.

## 2020-08-19 NOTE — ED PROVIDER NOTE - PHYSICAL EXAMINATION
Crystal:  General: No distress.  Mentation at baseline.   HEENT: WNL  Chest/Lungs: CTAB, No wheeze, No retractions, No increased work of breathing, Normal rate  Heart: S1S2 RRR, No M/R/G, Pules equal Bilaterally in upper and lower extremities distally  Abd: soft, NT/ND, No guarding, No rebound.  No hernias, no palpable masses.  Extrem: FROM in all joints, no significant edema noted, No ulcers.  Cap refil < 2sec.  Skin: No rash noted, warm dry.  Neuro:  Grossly normal.  Unable to ambulate. no motor deficits  Psychiatric: No evidence of delusions. No SI/HI.

## 2020-08-19 NOTE — ED PROVIDER NOTE - CARE PLAN
Principal Discharge DX:	Acute left-sided low back pain with left-sided sciatica  Secondary Diagnosis:	Spinal stenosis of lumbosacral region

## 2020-08-19 NOTE — PHYSICAL THERAPY INITIAL EVALUATION ADULT - PRECAUTIONS/LIMITATIONS, REHAB EVAL
difficulty in executing tasks due to trigger of severe pain in the low back radiating to left leg with exertion, remained supine and unable to tolerate sitting during this session.

## 2020-08-19 NOTE — PHYSICAL THERAPY INITIAL EVALUATION ADULT - ADDITIONAL COMMENTS
Pt states she usually is able to function independently doing ambulation using a rolling walker until she has this severe low back pain that makes her unable to do anything at present, she denies any falls or trauma.

## 2020-08-19 NOTE — PHYSICAL THERAPY INITIAL EVALUATION ADULT - GENERAL OBSERVATIONS, REHAB EVAL
Pt found supine, alert,oriented x 4, understand instructions but with difficulty execution in tasks, c/o severe pain in the low back radiating to the left leg aggravated even the sligthest movement and exertion.

## 2020-08-19 NOTE — H&P ADULT - NSHPPHYSICALEXAM_GEN_ALL_CORE
ICU Vital Signs Last 24 Hrs  T(C): 36.6 (19 Aug 2020 12:17), Max: 36.6 (19 Aug 2020 12:17)  T(F): 97.8 (19 Aug 2020 12:17), Max: 97.8 (19 Aug 2020 12:17)  HR: 71 (19 Aug 2020 12:17) (71 - 71)  BP: 141/74 (19 Aug 2020 12:17) (141/74 - 141/74)  BP(mean): --  ABP: --  ABP(mean): --  RR: 16 (19 Aug 2020 12:17) (16 - 16)  SpO2: 99% (19 Aug 2020 12:17) (99% - 99%)  GENERAL: NAD well-developed  HEAD:  Atraumatic, Normocephalic  EYES: EOMI, PERRLA, conjunctiva and sclera clear  ENMT: No tonsillar erythema, exudates, or enlargement; Moist mucous membranes, Good dentition, No lesions  NECK: Supple, No JVD, Normal thyroid  NERVOUS SYSTEM:  Alert & Oriented X3, Good concentration; Motor Strength 5/5 B/L upper and lower extremities; DTRs 2+ intact and symmetric  CHEST/LUNG: Clear to percussion bilaterally; No rales, rhonchi, wheezing, or rubs  HEART: Regular rate and rhythm; No murmurs, rubs, or gallops  ABDOMEN: Soft, Nontender, Nondistended; Bowel sounds present  EXTREMITIES:  2+ Peripheral Pulses, No clubbing, cyanosis, or edema  LYMPH: No lymphadenopathy   SKIN: No rashes or lesions

## 2020-08-19 NOTE — PHYSICAL THERAPY INITIAL EVALUATION ADULT - FOLLOWS COMMANDS/ANSWERS QUESTIONS, REHAB EVAL
understands instructions but with difficulty or inability to perform tasks due to severe pain in the low back radiating to left leg.

## 2020-08-19 NOTE — ED ADULT NURSE NOTE - ED STAT RN HANDOFF DETAILS 2
Report endorsed to hold RN Anna. Safety checks completed this shift. Safety rounds completed hourly.  IV sites remains WDL. Fall & skin precautions in place. Any issues endorsed to hold RN for follow up. Awaiting bed assignment

## 2020-08-19 NOTE — PHYSICAL THERAPY INITIAL EVALUATION ADULT - RANGE OF MOTION EXAMINATION, REHAB EVAL
bilateral lower extremity ROM was WFL (within functional limits)/but with c/o increased pain in the low back with movement in the LE especially the LLE/bilateral upper extremity ROM was WFL (within functional limits)

## 2020-08-19 NOTE — H&P ADULT - ASSESSMENT
85 years old female with recent admission for back pain returns without meds being given to her       IMPROVE VTE Individual Risk Assessment          RISK                                                          Points  [  ] Previous VTE                                                3  [  ] Thrombophilia                                             2  [  ] Lower limb paralysis                                   2        (unable to hold up >15 seconds)    [  ] Current Cancer                                             2         (within 6 months)  [  ] Immobilization > 24 hrs                              1  [  ] ICU/CCU stay > 24 hours                             1  [  ] Age > 60                                                         1    IMPROVE VTE Score: 2

## 2020-08-19 NOTE — PATIENT PROFILE ADULT - VISION (WITH CORRECTIVE LENSES IF THE PATIENT USUALLY WEARS THEM):
pt. stated use eyeglass/Partially impaired: cannot see medication labels or newsprint, but can see obstacles in path, and the surrounding layout; can count fingers at arm's length

## 2020-08-19 NOTE — PHYSICAL THERAPY INITIAL EVALUATION ADULT - IMPAIRMENTS FOUND, PT EVAL
aerobic capacity/endurance/ergonomics and body mechanics/muscle strength/gait, locomotion, and balance/c/o severe pain in the lumbar and left leg aggravated even with slight movement and exertion.

## 2020-08-19 NOTE — PHYSICAL THERAPY INITIAL EVALUATION ADULT - COORDINATION ASSESSED, REHAB EVAL
PRIOR AUTHORIZATION DENIED    Medication: Zarxio 300mcg/0.5ml- pa is denied     Denial Date: 4/29/2019    Denial Rational: denied due to criteria             Appeal Information:        heel to shin/impaired due to severe pain in low back upon movt both LE impaired due to severe pain in low back upon movt both LE/heel to shin

## 2020-08-19 NOTE — PHYSICAL THERAPY INITIAL EVALUATION ADULT - PLANNED THERAPY INTERVENTIONS, PT EVAL
strengthening/balance training/functional ability to be assessed, pt unable to perform any tasks during this encounter due to severe pain in the lumbar and left leg even with  the slightest movement and exertion./gait training/transfer training/bed mobility training

## 2020-08-19 NOTE — ED ADULT NURSE NOTE - ED STAT RN HANDOFF DETAILS
Report received from STIVEN Jaimes at 7pm. Assessment available on KB. will continue to monitor. pt is admitted and is awaiting bed assignment

## 2020-08-19 NOTE — PHYSICAL THERAPY INITIAL EVALUATION ADULT - PERTINENT HX OF CURRENT PROBLEM, REHAB EVAL
As per medical records pt was in the hospital and was discharged few days ago, now readmitted with c/o severe pain, pt denies fall or trauma.

## 2020-08-19 NOTE — ED ADULT TRIAGE NOTE - CHIEF COMPLAINT QUOTE
per EMS pt was discharged 4days returned to ED for lower back pain denies any injury. Per EMS pt did not receive medications on discharge. Per EMS pt needs social consult for aide at home

## 2020-08-19 NOTE — H&P ADULT - HISTORY OF PRESENT ILLNESS
The patient is a 85y Female complaining of low back pain.  · HPI Objective Statement: presents with recent discharge from [facility]- with Back pain.  Not able to procure pain meds as prescribed now with pain as before.  unable to walk and bear weight onto left side.- no other issues

## 2020-08-19 NOTE — PHYSICAL THERAPY INITIAL EVALUATION ADULT - DIAGNOSIS, PT EVAL
Pt with c/o severe pain in the low back and radiating to left leg, difficulty in doing tasks, found supine and unable to sit, stand and ambulate..

## 2020-08-19 NOTE — ED ADULT NURSE NOTE - OBJECTIVE STATEMENT
Pt received in bed alert and oriented and resting in bed with chronic mid and lower back pain that radiate down left leg which debilitating. As per Md's orders IV rachel placed blood specimen obtained and sent to the lab.

## 2020-08-19 NOTE — PHYSICAL THERAPY INITIAL EVALUATION ADULT - LIVES WITH, PROFILE
Pt states she lives in pvt house main floor, 2 steps to enter the house with rails, daughter lives 2nd floor and helps.

## 2020-08-20 DIAGNOSIS — E11.65 TYPE 2 DIABETES MELLITUS WITH HYPERGLYCEMIA: ICD-10-CM

## 2020-08-20 DIAGNOSIS — M17.12 UNILATERAL PRIMARY OSTEOARTHRITIS, LEFT KNEE: ICD-10-CM

## 2020-08-20 DIAGNOSIS — E78.00 PURE HYPERCHOLESTEROLEMIA, UNSPECIFIED: ICD-10-CM

## 2020-08-20 DIAGNOSIS — M54.5 LOW BACK PAIN: ICD-10-CM

## 2020-08-20 DIAGNOSIS — M54.9 DORSALGIA, UNSPECIFIED: ICD-10-CM

## 2020-08-20 DIAGNOSIS — G89.29 OTHER CHRONIC PAIN: ICD-10-CM

## 2020-08-20 LAB
GLUCOSE BLDC GLUCOMTR-MCNC: 112 MG/DL — HIGH (ref 70–99)
GLUCOSE BLDC GLUCOMTR-MCNC: 118 MG/DL — HIGH (ref 70–99)
GLUCOSE BLDC GLUCOMTR-MCNC: 149 MG/DL — HIGH (ref 70–99)
GLUCOSE BLDC GLUCOMTR-MCNC: 94 MG/DL — SIGNIFICANT CHANGE UP (ref 70–99)
SARS-COV-2 IGG SERPL QL IA: NEGATIVE — SIGNIFICANT CHANGE UP
SARS-COV-2 IGM SERPL IA-ACNC: <0.1 INDEX — SIGNIFICANT CHANGE UP

## 2020-08-20 PROCEDURE — 99233 SBSQ HOSP IP/OBS HIGH 50: CPT

## 2020-08-20 PROCEDURE — 72100 X-RAY EXAM L-S SPINE 2/3 VWS: CPT | Mod: 26

## 2020-08-20 PROCEDURE — 73521 X-RAY EXAM HIPS BI 2 VIEWS: CPT | Mod: 26

## 2020-08-20 RX ADMIN — TRAMADOL HYDROCHLORIDE 50 MILLIGRAM(S): 50 TABLET ORAL at 16:00

## 2020-08-20 RX ADMIN — TRAMADOL HYDROCHLORIDE 50 MILLIGRAM(S): 50 TABLET ORAL at 05:53

## 2020-08-20 RX ADMIN — TRAMADOL HYDROCHLORIDE 50 MILLIGRAM(S): 50 TABLET ORAL at 15:29

## 2020-08-20 RX ADMIN — HEPARIN SODIUM 5000 UNIT(S): 5000 INJECTION INTRAVENOUS; SUBCUTANEOUS at 05:53

## 2020-08-20 RX ADMIN — METHOCARBAMOL 500 MILLIGRAM(S): 500 TABLET, FILM COATED ORAL at 17:14

## 2020-08-20 RX ADMIN — TRAMADOL HYDROCHLORIDE 50 MILLIGRAM(S): 50 TABLET ORAL at 06:40

## 2020-08-20 RX ADMIN — CELECOXIB 200 MILLIGRAM(S): 200 CAPSULE ORAL at 11:37

## 2020-08-20 RX ADMIN — SENNA PLUS 2 TABLET(S): 8.6 TABLET ORAL at 21:10

## 2020-08-20 RX ADMIN — HEPARIN SODIUM 5000 UNIT(S): 5000 INJECTION INTRAVENOUS; SUBCUTANEOUS at 17:14

## 2020-08-20 RX ADMIN — METHOCARBAMOL 500 MILLIGRAM(S): 500 TABLET, FILM COATED ORAL at 05:53

## 2020-08-20 RX ADMIN — CELECOXIB 200 MILLIGRAM(S): 200 CAPSULE ORAL at 12:00

## 2020-08-20 RX ADMIN — SIMVASTATIN 40 MILLIGRAM(S): 20 TABLET, FILM COATED ORAL at 21:09

## 2020-08-20 NOTE — PROGRESS NOTE ADULT - PROBLEM SELECTOR PLAN 1
MRI with w/o acute findings, multilevel degenerative changes  pain management   Seen by ortho, no acute intervention  Dc to [facility]

## 2020-08-20 NOTE — CONSULT NOTE ADULT - SUBJECTIVE AND OBJECTIVE BOX
85F recently came to [facility] ED 8/16 for back pain radiating down LLE, received MRI LSp, sent out. Now returns with continued back pain and states she was unable to get pain meds due to social issues. Patient reports she ambulates with a walker at baseline but has been unable to ambulate over past few days. She reports NO recent trauma and that the back pain began about 2 weeks ago. She denies any bowel/bladder incontinence. She reports pain in her lower back that radiates down her LLE.    LABS:                        12.5   10.96 )-----------( 306      ( 19 Aug 2020 13:20 )             38.1     19 Aug 2020 13:20    140    |  103    |  34     ----------------------------<  98     4.2     |  27     |  0.92     Ca    9.5        19 Aug 2020 13:20    TPro  8.0    /  Alb  3.5    /  TBili  0.5    /  DBili  x      /  AST  36     /  ALT  67     /  AlkPhos  68     19 Aug 2020 13:20    PT/INR - ( 19 Aug 2020 13:20 )   PT: 12.3 sec;   INR: 1.06 ratio         PTT - ( 19 Aug 2020 13:20 )  PTT:23.9 sec    Vital Signs Last 24 Hrs  T(C): 36.4 (20 Aug 2020 05:43), Max: 36.8 (19 Aug 2020 20:18)  T(F): 97.6 (20 Aug 2020 05:43), Max: 98.2 (19 Aug 2020 20:18)  HR: 63 (20 Aug 2020 05:43) (60 - 76)  BP: 119/53 (20 Aug 2020 05:43) (119/53 - 156/65)  BP(mean): --  RR: 18 (20 Aug 2020 05:43) (16 - 18)  SpO2: 95% (20 Aug 2020 05:43) (95% - 99%)    Imaging:   MR LSp: Disc herniations noted at L1-L5, Mild canal stenosis at L1-L5, Moderate canal stenosis at L5/S1  Xr L Spine: Significant degeneration / osteophytes - no fractures noted   Xr: Pelvis/Hips    PHYSICAL EXAM  GEN: In acute pain, AAOx3  *Patient difficult to examine due to pain / unable to cooperate with exam due to back pain      SPINE:  Skin intact  TTP over the bony prominences of the cervical / thoracic / lumbar / sacral spine  + Paraspinal TTP of the cervical / thoracic / lumbar / sacaral spine  ***patient reports pain throughout entire back globally with pain worse with palpation paraspinally Lumbar region  No bony step-offs   Grossly moving all extremities  SILT C5-T1  SILT L2-S1    Motor:                          Deltoid       Biceps      Triceps      Wrist Ext      Finger Flex    Finger Abduction   RIGHT             5/5             5/5             5/5             5/5                 5/5                     5/5  LEFT                5/5             5/5             5/5             5/5                 5/5                     5/5                          Hip Flex       Knee Ext        Knee Flex     Dorsiflex      Hallux Ext        PlantarFlex  RIGHT            5/5                5/5                 5/5               5/5                 5/5                    5/5  LEFT               4/5                5/5                 5/5               5/5                 5/5                    5/5  ***Patient able to HF to 20 degrees with pain. Patient 5/5 throughout but complains of pain with any movement     Sensory:                      C5      C6      C7      C8       T1          RIGHT          2         2        2         2         2          (0=absent, 1=impaired, 2=normal, NT=not testable)  LEFT             2         2        2         2         2          (0=absent, 1=impaired, 2=normal, NT=not testable)                        L2        L3       L4      L5       S1          RIGHT        2          2         2        2        2           (0=absent, 1=impaired, 2=normal, NT=not testable)  LEFT           2          2         2        2        2           (0=absent, 1=impaired, 2=normal, NT=not testable)    Negative Swartz's sign bilaterally  Negative Babinski bilaterally   Negative Myoclonus bilaterally

## 2020-08-20 NOTE — CONSULT NOTE ADULT - ASSESSMENT
85F With lower back pain    -Patient does have multiple disc herniations in the lumbar spine, however they are not causing significant stenosis of canal/foramen  -Recommend WBAT/PT  -Pain control gabapentin, Flexaril   -Follow up with Dr. Edwards Orthopedic Spine Surgeon outpatient   -No acute orthopedic surgical intervention needed at this time  -Will discuss with attending and advise if plan changes   -Ortho stable for discharge 85F With lower back pain    -Patient does have multiple disc herniations in the lumbar spine, however they are not causing significant stenosis of canal/foramen  -Recommend WBAT/PT  -Pain control gabapentin, Flexaril   -Follow up with Dr. Edwards Orthopedic Spine Surgeon outpatient   -No acute orthopedic surgical intervention needed at this time  -Discussed with Dr. Edwards and will advise if plan changes   -Ortho stable for discharge

## 2020-08-20 NOTE — PROGRESS NOTE ADULT - SUBJECTIVE AND OBJECTIVE BOX
Patient is a 85y old  Female who presents with a chief complaint of back pain (20 Aug 2020 11:32)    INTERVAL HPI/OVERNIGHT EVENTS:  Pt was seen and examined, no acute events.    MEDICATIONS  (STANDING):  celecoxib 200 milliGRAM(s) Oral daily  dextrose 5%. 1000 milliLiter(s) (50 mL/Hr) IV Continuous <Continuous>  dextrose 50% Injectable 12.5 Gram(s) IV Push once  dextrose 50% Injectable 25 Gram(s) IV Push once  dextrose 50% Injectable 25 Gram(s) IV Push once  heparin   Injectable 5000 Unit(s) SubCutaneous every 12 hours  insulin lispro (HumaLOG) corrective regimen sliding scale   SubCutaneous three times a day before meals  insulin lispro (HumaLOG) corrective regimen sliding scale   SubCutaneous at bedtime  methocarbamol 500 milliGRAM(s) Oral two times a day  senna 2 Tablet(s) Oral at bedtime  simvastatin 40 milliGRAM(s) Oral at bedtime    MEDICATIONS  (PRN):  dextrose 40% Gel 15 Gram(s) Oral once PRN Blood Glucose LESS THAN 70 milliGRAM(s)/deciliter  glucagon  Injectable 1 milliGRAM(s) IntraMuscular once PRN Glucose LESS THAN 70 milligrams/deciliter  traMADol 50 milliGRAM(s) Oral every 6 hours PRN Mild Pain      Allergies  No Known Allergies      Vital Signs Last 24 Hrs  T(C): 36.8 (20 Aug 2020 12:11), Max: 36.8 (19 Aug 2020 20:18)  T(F): 98.2 (20 Aug 2020 12:11), Max: 98.2 (19 Aug 2020 20:18)  HR: 70 (20 Aug 2020 12:11) (60 - 70)  BP: 112/55 (20 Aug 2020 12:11) (112/55 - 156/65)  BP(mean): --  RR: 18 (20 Aug 2020 12:11) (18 - 18)  SpO2: 98% (20 Aug 2020 12:11) (95% - 98%)      PHYSICAL EXAM:  GENERAL: NAD  HEAD:  Atraumatic  EYES: PERRLA  NERVOUS SYSTEM:  Awake, alert  CHEST/LUNG: Clear  HEART: RRR  ABDOMEN: Soft, non tender  EXTREMITIES: no edema      LABS:                        12.5   10.96 )-----------( 306      ( 19 Aug 2020 13:20 )             38.1     08-19    140  |  103  |  34<H>  ----------------------------<  98  4.2   |  27  |  0.92    Ca    9.5      19 Aug 2020 13:20    TPro  8.0  /  Alb  3.5  /  TBili  0.5  /  DBili  x   /  AST  36  /  ALT  67  /  AlkPhos  68  08-19    PT/INR - ( 19 Aug 2020 13:20 )   PT: 12.3 sec;   INR: 1.06 ratio         PTT - ( 19 Aug 2020 13:20 )  PTT:23.9 sec  Urinalysis Basic - ( 19 Aug 2020 13:40 )    Color: Yellow / Appearance: Clear / S.015 / pH: x  Gluc: x / Ketone: Negative  / Bili: Negative / Urobili: Negative mg/dL   Blood: x / Protein: Negative mg/dL / Nitrite: Negative   Leuk Esterase: Negative / RBC: x / WBC x   Sq Epi: x / Non Sq Epi: x / Bacteria: x      CAPILLARY BLOOD GLUCOSE      POCT Blood Glucose.: 118 mg/dL (20 Aug 2020 15:36)  POCT Blood Glucose.: 112 mg/dL (20 Aug 2020 11:36)  POCT Blood Glucose.: 94 mg/dL (20 Aug 2020 07:21)  POCT Blood Glucose.: 114 mg/dL (19 Aug 2020 21:23)      RADIOLOGY & ADDITIONAL TESTS:    Imaging Personally Reviewed:  [ ] YES  [ ] NO    Consultant(s) Notes Reviewed:  [ ] YES  [ ] NO    Care Discussed with Consultants/Other Providers [ ] YES  [ ] NO

## 2020-08-21 ENCOUNTER — TRANSCRIPTION ENCOUNTER (OUTPATIENT)
Age: 85
End: 2020-08-21

## 2020-08-21 VITALS
OXYGEN SATURATION: 99 % | DIASTOLIC BLOOD PRESSURE: 61 MMHG | RESPIRATION RATE: 18 BRPM | TEMPERATURE: 98 F | SYSTOLIC BLOOD PRESSURE: 103 MMHG | HEART RATE: 84 BPM

## 2020-08-21 LAB
A1C WITH ESTIMATED AVERAGE GLUCOSE RESULT: 6.9 % — HIGH (ref 4–5.6)
ESTIMATED AVERAGE GLUCOSE: 151 MG/DL — HIGH (ref 68–114)
GLUCOSE BLDC GLUCOMTR-MCNC: 134 MG/DL — HIGH (ref 70–99)
GLUCOSE BLDC GLUCOMTR-MCNC: 138 MG/DL — HIGH (ref 70–99)
GLUCOSE BLDC GLUCOMTR-MCNC: 268 MG/DL — HIGH (ref 70–99)

## 2020-08-21 PROCEDURE — 99239 HOSP IP/OBS DSCHRG MGMT >30: CPT

## 2020-08-21 RX ORDER — ACETAMINOPHEN 500 MG
650 TABLET ORAL ONCE
Refills: 0 | Status: COMPLETED | OUTPATIENT
Start: 2020-08-21 | End: 2020-08-21

## 2020-08-21 RX ADMIN — Medication 650 MILLIGRAM(S): at 16:36

## 2020-08-21 RX ADMIN — HEPARIN SODIUM 5000 UNIT(S): 5000 INJECTION INTRAVENOUS; SUBCUTANEOUS at 05:28

## 2020-08-21 RX ADMIN — TRAMADOL HYDROCHLORIDE 50 MILLIGRAM(S): 50 TABLET ORAL at 05:41

## 2020-08-21 RX ADMIN — Medication 650 MILLIGRAM(S): at 16:03

## 2020-08-21 RX ADMIN — TRAMADOL HYDROCHLORIDE 50 MILLIGRAM(S): 50 TABLET ORAL at 15:04

## 2020-08-21 RX ADMIN — TRAMADOL HYDROCHLORIDE 50 MILLIGRAM(S): 50 TABLET ORAL at 13:29

## 2020-08-21 RX ADMIN — METHOCARBAMOL 500 MILLIGRAM(S): 500 TABLET, FILM COATED ORAL at 05:29

## 2020-08-21 RX ADMIN — CELECOXIB 200 MILLIGRAM(S): 200 CAPSULE ORAL at 11:30

## 2020-08-21 RX ADMIN — TRAMADOL HYDROCHLORIDE 50 MILLIGRAM(S): 50 TABLET ORAL at 06:41

## 2020-08-21 RX ADMIN — Medication 3: at 11:30

## 2020-08-21 NOTE — DISCHARGE NOTE PROVIDER - CARE PROVIDER_API CALL
pcp,   Phone: (   )    -  Fax: (   )    -  Follow Up Time:     Ary Edwards  ORTHOPAEDIC SURGERY  [address] [facility], [place] [address]  Phone: [phone]  Fax: [phone]  Follow Up Time:

## 2020-08-21 NOTE — DISCHARGE NOTE PROVIDER - HOSPITAL COURSE
85 years old female with recent admission for back pain returns without meds being given to her              Problem/Plan - 1:    ·  Problem: Intractable back pain.  Plan: MRI with w/o acute findings, multilevel degenerative changes    pain management     Seen by ortho, no acute intervention    Dc to [facility].          Problem/Plan - 2:    ·  Problem: Hypercholesteremia.  Plan: Continue statin.          Problem/Plan - 3:    ·  Problem: Arthritis.  Plan: as above.          Problem/Plan - 4:    ·  Problem: Type 2 diabetes mellitus with hyperglycemia, with long-term current use of insulin.  Plan: not on meds at home.    Follow up Hb A1c.          Problem/Plan - 5:    ·  Problem: Preventive measure.  Plan: sq lovenox.

## 2020-08-21 NOTE — DISCHARGE NOTE PROVIDER - NSDCMRMEDTOKEN_GEN_ALL_CORE_FT
methocarbamol 500 mg oral tablet: 1 tab(s) orally 3 times a day  senna oral tablet: 2 tab(s) orally once a day (at bedtime)  simvastatin 40 mg oral tablet: 1 tab(s) orally once a day (at bedtime)  traMADol 50 mg oral tablet: 1 tab(s) orally 4 times a day, As Needed -Mild Pain

## 2020-08-21 NOTE — DISCHARGE NOTE PROVIDER - NSDCCPCAREPLAN_GEN_ALL_CORE_FT
PRINCIPAL DISCHARGE DIAGNOSIS  Diagnosis: Acute left-sided low back pain with left-sided sciatica  Assessment and Plan of Treatment: MRI with w/o acute findings, multilevel degenerative changes  pain management   Seen by ortho, no acute intervention  Dc to [facility].      SECONDARY DISCHARGE DIAGNOSES  Diagnosis: DM (diabetes mellitus)  Assessment and Plan of Treatment: not on meds  follow up Hb A1c    Diagnosis: Spinal stenosis of lumbosacral region  Assessment and Plan of Treatment: follow up with ortho outpt

## 2020-08-21 NOTE — DISCHARGE NOTE NURSING/CASE MANAGEMENT/SOCIAL WORK - PATIENT PORTAL LINK FT
You can access the FollowMyHealth Patient Portal offered by [facility] by registering at the following website: [email or web address]. By joining [employer]’s FollowMyHealth portal, you will also be able to view your health information using other applications (apps) compatible with our system.

## 2020-08-28 DIAGNOSIS — M54.42 LUMBAGO WITH SCIATICA, LEFT SIDE: ICD-10-CM

## 2020-08-28 DIAGNOSIS — M13.80 OTHER SPECIFIED ARTHRITIS, UNSPECIFIED SITE: ICD-10-CM

## 2020-08-28 DIAGNOSIS — M48.07 SPINAL STENOSIS, LUMBOSACRAL REGION: ICD-10-CM

## 2020-08-28 DIAGNOSIS — E11.65 TYPE 2 DIABETES MELLITUS WITH HYPERGLYCEMIA: ICD-10-CM

## 2020-08-28 DIAGNOSIS — Z79.4 LONG TERM (CURRENT) USE OF INSULIN: ICD-10-CM

## 2020-08-28 DIAGNOSIS — E78.00 PURE HYPERCHOLESTEROLEMIA, UNSPECIFIED: ICD-10-CM

## 2020-08-28 DIAGNOSIS — Z79.899 OTHER LONG TERM (CURRENT) DRUG THERAPY: ICD-10-CM

## 2020-12-28 NOTE — ED ADULT NURSE NOTE - NSSUHOSCREENINGYN_ED_ALL_ED
[id number] [place] [address] with [facility]  12/28/2020    9:13 AM      51 RHM    Aug 5 woke up with dyspnea with chest tightness --->  ER ----> ruled our MI and other causes.     The symptom lasted 10 minutes except th speech and intact comprehension  CN 2-12 OK  No motor and sensory focal deficit  Gait and coordination OK  Reflexes symmetric and no pathologic reflexes seen      IMPRESSION:  Paresthesia arms and legs  (primary encounter diagnosis)  Sensation of chest pre Yes - the patient is able to be screened

## 2021-05-24 NOTE — DISCHARGE NOTE PROVIDER - CARE PROVIDERS DIRECT ADDRESSES
Subjective   Patient ID: Carolina is a 43 year old female.    Carolina presents for IUD placement.  The reason for IUD placement is Menstrual management and contraception. We have previously discussed the options for menorrhagia and family planning including reversible and nonreversible options. She understands the different types and sizes of IUDs available and their attendant risks and side effects.    IUD Procedure  Performed by: Leida Sal MD  Authorized by: Leida Sal MD         Pre-Procedure                A bivalve speculum was placed in the vagina and the cervix exposed.      The cervix was prepped with antiseptic    Insertion Details:  IUD type:Mirena  A paracervical block was placed (enter agent and dose in comments).  Dilation was not required. The uterus was then sounded to  8 cm.The IUD was inserted through the cervix into the cavity of the uterus until the fundus was reached. It was then deployed from the introducer and the introducer removed.   The IUD strings were then trimmed. The trailing strings length was approximately 3.5 cm  Insertion comments:  5cc 1% lidocaine     Post-procedure   Care instructions given in the AVS      Carolina tolerated the procedure well.     Universal Protocol    Relevant documents present and verified: Yes    Test results available and properly labeled: Yes    Site marked: Yes    Imaging studies available: Yes    Required items: Yes    Patient understands and consents to planned procedure: Yes         Assessment/Plan  ED Diagnosis   1. Encounter for IUD insertion  levonorgestrel (MIRENA) (52 MG) 20 MCG/DAY intrauterine device 52 mg   2. Menorrhagia with regular cycle  levonorgestrel (MIRENA) (52 MG) 20 MCG/DAY intrauterine device 52 mg     Return in 4 weeks (on 6/21/2021). if unable to palpate strings    S/P IUD Insertion, tolerated well.  Post procedure instructions provided in AVS    Leida Sal MD  5/24/2021     ,DirectAddress_Unknown

## 2021-09-23 NOTE — PATIENT PROFILE ADULT - NSPROCHRONICPAIN_GEN_A_NUR
yes
Latisse Counseling: I hereby request and authorize my provider to prescribe Latisse® for the purpose of attempting to improve my appearance with respect to the following conditions:  improve thickness, darkness and length of eyelashes                                                                                      \\n\\nMy provider has fully explained, in terms clear to me, the effects and nature of the medication and the foreseeable risks involved.  I have been given the opportunity to ask any questions regarding these matters, and these questions have been answered to my satisfaction.  Specific risks include but are not limited to:\\n\\n    • Itching sensation in the eyes and/or eye redness (reported in approximately 4% of patients)\\n    • Darkening of skin around the eye\\n    • Redness and/or irritation of the eyelids\\n    • Increased brown pigmentation of the colored part of the eye which is likely to be permanent.\\n\\nI understand that I should not use Latisse if I have an active infection, inflammation, decreased vision or eye trauma. I have informed my provider if I have been diagnosed with any eye condition, such as glaucoma or macular edema.  \\n\\nI have been advised that the goal of the medication I have requested is improvement in appearance, not perfection, that there is a possibility that imperfections might ensue, and that the results might not meet my expectations or the goals that have been established.  In relation to this I know that the practice of medicine is not an exact science and that, therefore, no guarantee or assurance has been made by anyone regarding the medication which I have herein requested and authorized.  \\n\\n\\nPATIENT INSTRUCTIONS FOR LATISSE®\\n\\n\\n\\nApplication Instructions\\n    • Apply nightly to the skin of the upper eyelid margin at the base of the eyelashes.\\n    • Dispose of the applicator after one use.\\n    • Remove contact lens prior to administration of eye drop; may reinsert contact lens 15 minutes afterwards.\\n    • If use more than one ophthalmic drug, administer them at least 5 minutes apart.\\n\\n\\nPrecautions\\n    • Do not use Latisse® if you have an active infection, inflammation, decreased vision or eye trauma. \\n    • If you have an eye condition, such as glaucoma or macular edema, inform the doctor who prescribed Latisse® and consult with your ophthalmologist or optometrist before using Latisse®.\\n    • DO NOT apply to the lower eyelash line.\\n    • Avoid touching the tip of the bottle to the eye or other surfaces in order to avoid contamination.\\n    • Any excess solution outside the upper eyelid margin should be blotted with a tissue or other absorbent material.\\n    • If you develop or experience any eye problems or have eye surgery, consult your doctor immediately about continued use of Latisse®\\n\\n\\nReminders\\n    • Roughly 15% of patients using Latisse® observe no change in their lash length, thickness or darkness.\\n    • Effect is not permanent and can be expected to gradually return to original level upon discontinuation of treatment.
Detail Level: Zone

## 2022-04-20 NOTE — PROGRESS NOTE ADULT - PROBLEM SELECTOR PROBLEM 5
----- Message from Amirah Sanders sent at 4/20/2022 10:16 AM CDT -----  Contact: self  Type:  RX Refill Request    Who Called:  patient  Refill or New Rx:  refill  RX Name and Strength:  temazepam (RESTORIL) 15 mg Cap  How is the patient currently taking it? (ex. 1XDay):  as directed  Is this a 30 day or 90 day RX:  30  Preferred Pharmacy with phone number:    [facility]/pharm[facility] #[id number] - [place] - [address]  [address] [address]  Phone: [phone] Fax: [phone]  Local or Mail Order:  local  Ordering Provider:  Dr Janessa Maxwell Call Back Number:  [phone] (home) [phone] (work)  Additional Information:  thanks      
Pt notified medication was sent in 03/2022 with 2 refills. Pt verbalized understanding.   
Preventive measure
Preventive measure

## 2022-09-12 NOTE — ED ADULT NURSE REASSESSMENT NOTE - NS ED NURSE REASSESS COMMENT FT1
Assisted to bathroom with this RN assistance. Limping and visibly in pain while ambulating. Dr. Sabillon witnessed. Safely returned to bed. cardiac monitoring resumed.
Assuming patient's care for coverage. Report received from RN and patient asleep during rounding. Assessment available on [facility]. Will continue to monitor
Pt resting comfortably at this time. No s/s of pain noted. Sent to CT via stretcher and returned from CT at this time. Cardiac monitoring in progress
1-2x/week

## 2022-09-20 NOTE — PROGRESS NOTE ADULT - I WAS PHYSICALLY PRESENT FOR THE KEY PORTIONS OF THE EVALUATION AND MANAGEMENT (E/M) SERVICE PROVIDED.  I AGREE WITH THE ABOVE HISTORY, PHYSICAL, AND PLAN WHICH I HAVE REVIEWED AND EDITED WHERE APPROPRIATE
Pt notified that letter was sent to her through portal and rx for Omeprazole was sent to her [facility]'s pharmacy.    Statement Selected

## 2025-02-03 NOTE — ED ADULT TRIAGE NOTE - INTERNATIONAL TRAVEL
Is the patient due for a refill? Yes    Was the patient seen the last 15 months? Yes    Date of last office visit: 8/28/24    Does the patient have an upcoming appointment?  No       Provider to refill:BE    Does the patient have [facility] and need 100-day supply? (This applies to ALL medications) Yes, quantity updated to 100 days      No